# Patient Record
Sex: FEMALE | Race: WHITE | NOT HISPANIC OR LATINO | Employment: OTHER | URBAN - METROPOLITAN AREA
[De-identification: names, ages, dates, MRNs, and addresses within clinical notes are randomized per-mention and may not be internally consistent; named-entity substitution may affect disease eponyms.]

---

## 2023-11-15 RX ORDER — SIMVASTATIN 20 MG/1
1 TABLET, FILM COATED ORAL DAILY
COMMUNITY

## 2023-11-15 RX ORDER — PHENTERMINE HYDROCHLORIDE 37.5 MG/1
37.5 TABLET ORAL DAILY
COMMUNITY
Start: 2023-10-19 | End: 2023-11-18

## 2023-11-15 RX ORDER — MULTIVIT-MIN/IRON/FOLIC ACID/K 18-600-40
1 CAPSULE ORAL DAILY
COMMUNITY

## 2023-11-15 RX ORDER — OMEPRAZOLE 20 MG/1
CAPSULE, DELAYED RELEASE ORAL
COMMUNITY

## 2023-11-15 RX ORDER — NIRMATRELVIR AND RITONAVIR 300-100 MG
KIT ORAL
COMMUNITY
Start: 2023-09-27

## 2023-11-15 RX ORDER — INHALER, ASSIST DEVICES
SPACER (EA) MISCELLANEOUS
COMMUNITY
Start: 2023-03-03

## 2023-11-15 RX ORDER — TIZANIDINE HYDROCHLORIDE 2 MG/1
CAPSULE, GELATIN COATED ORAL
COMMUNITY
Start: 2023-04-17

## 2023-11-15 RX ORDER — COVID-19 ANTIGEN TEST
KIT MISCELLANEOUS
COMMUNITY
Start: 2023-04-24

## 2023-11-15 RX ORDER — CEFUROXIME AXETIL 250 MG/1
250 TABLET ORAL 2 TIMES DAILY
COMMUNITY
Start: 2023-01-16 | End: 2023-01-23

## 2023-11-15 RX ORDER — CHOLECALCIFEROL (VITAMIN D3) 25 MCG
1 TABLET ORAL DAILY
COMMUNITY

## 2023-11-15 RX ORDER — CLOTRIMAZOLE 10 MG/1
LOZENGE ORAL; TOPICAL
COMMUNITY
Start: 2023-05-01

## 2023-11-15 RX ORDER — ALBUTEROL SULFATE 90 UG/1
AEROSOL, METERED RESPIRATORY (INHALATION)
COMMUNITY
Start: 2023-03-03

## 2023-11-15 RX ORDER — CLINDAMYCIN HYDROCHLORIDE 300 MG/1
300 CAPSULE ORAL EVERY 8 HOURS
COMMUNITY
Start: 2022-12-21

## 2023-11-15 RX ORDER — PREDNISONE 20 MG/1
TABLET ORAL
COMMUNITY
Start: 2023-04-20

## 2023-11-15 RX ORDER — FLUTICASONE PROPIONATE 50 MCG
2 SPRAY, SUSPENSION (ML) NASAL DAILY
COMMUNITY
Start: 2023-10-19

## 2023-11-15 RX ORDER — MESALAMINE 800 MG/1
3 TABLET, DELAYED RELEASE ORAL 2 TIMES DAILY
COMMUNITY
Start: 2017-06-26

## 2023-11-15 RX ORDER — AZITHROMYCIN 250 MG/1
TABLET, FILM COATED ORAL
COMMUNITY
Start: 2023-09-25

## 2023-11-15 RX ORDER — TRIAMTERENE AND HYDROCHLOROTHIAZIDE 37.5; 25 MG/1; MG/1
1 CAPSULE ORAL DAILY
COMMUNITY

## 2023-11-15 RX ORDER — VALACYCLOVIR HYDROCHLORIDE 1 G/1
TABLET, FILM COATED ORAL
COMMUNITY
Start: 2022-11-28

## 2023-11-15 RX ORDER — TOFACITINIB 11 MG/1
1 TABLET, FILM COATED, EXTENDED RELEASE ORAL DAILY
COMMUNITY
Start: 2019-12-19

## 2023-11-16 ENCOUNTER — OFFICE VISIT (OUTPATIENT)
Dept: GASTROENTEROLOGY | Facility: CLINIC | Age: 68
End: 2023-11-16
Payer: MEDICARE

## 2023-11-16 ENCOUNTER — LAB (OUTPATIENT)
Dept: LAB | Facility: LAB | Age: 68
End: 2023-11-16
Payer: MEDICARE

## 2023-11-16 VITALS
HEIGHT: 64 IN | SYSTOLIC BLOOD PRESSURE: 144 MMHG | DIASTOLIC BLOOD PRESSURE: 85 MMHG | HEART RATE: 75 BPM | BODY MASS INDEX: 35.67 KG/M2 | TEMPERATURE: 97.2 F | WEIGHT: 208.9 LBS

## 2023-11-16 DIAGNOSIS — K59.00 CONSTIPATION, UNSPECIFIED CONSTIPATION TYPE: ICD-10-CM

## 2023-11-16 DIAGNOSIS — R79.89 ABNORMAL LFTS (LIVER FUNCTION TESTS): ICD-10-CM

## 2023-11-16 DIAGNOSIS — K51.30 ULCERATIVE RECTOSIGMOIDITIS WITHOUT COMPLICATION (MULTI): ICD-10-CM

## 2023-11-16 DIAGNOSIS — R79.89 ABNORMAL LFTS (LIVER FUNCTION TESTS): Primary | ICD-10-CM

## 2023-11-16 LAB
ALBUMIN SERPL BCP-MCNC: 5 G/DL (ref 3.4–5)
ALP SERPL-CCNC: 58 U/L (ref 33–136)
ALT SERPL W P-5'-P-CCNC: 52 U/L (ref 7–45)
AST SERPL W P-5'-P-CCNC: 46 U/L (ref 9–39)
BILIRUB DIRECT SERPL-MCNC: 0.2 MG/DL (ref 0–0.3)
BILIRUB SERPL-MCNC: 0.8 MG/DL (ref 0–1.2)
PROT SERPL-MCNC: 7.3 G/DL (ref 6.4–8.2)

## 2023-11-16 PROCEDURE — 1036F TOBACCO NON-USER: CPT | Performed by: INTERNAL MEDICINE

## 2023-11-16 PROCEDURE — 36415 COLL VENOUS BLD VENIPUNCTURE: CPT

## 2023-11-16 PROCEDURE — 80076 HEPATIC FUNCTION PANEL: CPT

## 2023-11-16 PROCEDURE — 99214 OFFICE O/P EST MOD 30 MIN: CPT | Performed by: INTERNAL MEDICINE

## 2023-11-16 PROCEDURE — 1159F MED LIST DOCD IN RCRD: CPT | Performed by: INTERNAL MEDICINE

## 2023-11-16 PROCEDURE — 1126F AMNT PAIN NOTED NONE PRSNT: CPT | Performed by: INTERNAL MEDICINE

## 2023-11-16 RX ORDER — TOLTERODINE 4 MG/1
CAPSULE, EXTENDED RELEASE ORAL
COMMUNITY
Start: 2023-11-01

## 2023-11-16 RX ORDER — NYSTATIN AND TRIAMCINOLONE ACETONIDE 100000; 1 [USP'U]/G; MG/G
CREAM TOPICAL 3 TIMES DAILY
COMMUNITY
Start: 2021-11-26

## 2023-11-16 RX ORDER — LANOLIN ALCOHOL/MO/W.PET/CERES
1000 CREAM (GRAM) TOPICAL
COMMUNITY

## 2023-11-16 RX ORDER — LIDOCAINE HYDROCHLORIDE 20 MG/ML
JELLY TOPICAL
COMMUNITY
Start: 2021-08-04

## 2023-11-16 RX ORDER — TOFACITINIB 11 MG/1
11 TABLET, FILM COATED, EXTENDED RELEASE ORAL DAILY
COMMUNITY

## 2023-11-16 RX ORDER — ASPIRIN 81 MG/1
81 TABLET ORAL NIGHTLY
COMMUNITY

## 2023-11-16 RX ORDER — VITAMIN E MIXED 400 UNIT
CAPSULE ORAL
COMMUNITY
Start: 2022-04-14

## 2023-11-16 RX ORDER — HYOSCYAMINE SULFATE 0.12 MG/1
TABLET, ORALLY DISINTEGRATING ORAL EVERY 6 HOURS
COMMUNITY
Start: 2019-05-16

## 2023-11-16 ASSESSMENT — PAIN SCALES - GENERAL: PAINLEVEL: 0-NO PAIN

## 2023-11-16 NOTE — PROGRESS NOTES
Subjective   Patient ID: Kay Beltran is a 67 y.o. female who presents for follow-up of UC.  Last seen 11/2022.  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 Course complicated by recurrent C. difficile and s/p FMT 10/2015.     Tried and eventually lost response to multiple medications including 5-ASA, prednisone, Humira, Simponi and Entyvio.    Started Xeljanz 4/29/19 and colitis well controlled on Xeljanz 10 mg BID. 2 formed stools daily without bleeding, cramping, and only occasional urgency. Dose lowered to Xeljanz 5 mg BID and 12/2019 changed to Xeljanz XR 11 mg daily.     7/2020 colonoscopy showed severe sigmoid diverticulosis and Mota 0 colitis in rectum, sigmoid, and at IC valve. Path focal active colitis in cecum/AC with chronic active colitis in left colon with a normal rectum. ?Crohn's colitis?    (+) hospitalized with COVID 19 8/2020    Late 2021 stopped mesalamine.  When last seen 11/2022 having 1-2 soft BM/day without blood/urgency/nighttime awakenings/EIM’s/abdominal pain.  Colonoscopy 2024-25.    In follow-up today, the patient feels well. She is not having any diarrhea. No bloody bowel movements. Taking her Xeljanz XR 11mg daily. She has started on Adipex about a month ago, and she has lost about 13 lbs. She gets some palpitations from this in the morning, but then she eats some breakfast and the palpitations dissipate. Also says that her BP has been running on the higher side since she started Adipex (used to be in the 120s systolic, but recently including today has been running in th e 140s. She also had some pellet like stools in the beginning when she started on Adipex, but now she is doing better and is now having bowel movements every other day. Stools are not as hard now. She is not taking any laxatives. She was thinking of taking some Dulcolax the other day, but did not. Eats a lot of salad and drinks  plenty of water. Also, both she and her  had COVID again in Sept 2023 (says this is her 4th time getting it) and was symptomatic from it and had nausea and vomiting, and fevers and chills. She took Paxlovid. Has some rare aches and pains in her muscles and joints, but attributes this to getting old, but denies any persistent or severe joint pain. She also had a small cyst in the middle of her chest that her PCP removed recently and her wound is well healed now. Denies any abdominal pain, nausea, vomiting, diarrhea. She rarely drinks alcohol (about once per year). AST/ALT are very mildly elevated per labs from March 2023 (< 2 times ULN). She does have fatty liver based on an ultrasound done on 6/9/2022.     Review of Systems  A 12 point ROS was performed and is negative except for HPI.     Objective   Physical Exam  GEN: Elderly white female, sitting comfortably in chair, in NAD  HEENT: No pallor, no scleral icterus. No aphthous ulcers in oral cavity  CVS: S1, S2 heard, RRR, no MRG  CHEST: CTAB  GI: soft, obese body habitus, non-tender, tympanic to percussion, no organomegaly. BS +ve. No fluid wave/ascites   NEURO: AOX3, no asterixis. 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 Patient is asymptomatic on Xeljanz 11 mg once daily. She has no active GI symptoms. No longer on mesalamine.     She continues to do well on current therapy with no disease activity. Next colonoscopy will be in 2024 or 2025. Following that, we may discuss medication de-escalation.      #Constipation: Had gotten better since seen in clinic last time, but then she started Adipex and she is not having daily bowel movements. She is still going every other day or rarely once every 3 days. She has Dulcolax at home which she can take PRN. She can take prune juice, MiraLAX, and/or soluble fiber on a regular basis until she begins to having regular, daily BMs.     #Elevated transaminases: Unclear cause, but unlikely to be related to Xeljanz. Most likely related to known fatty liver disease. No hepatomegaly on exam. LFTs have not been checked since March 2023, so we will repeat it today     Plan:  1) continue Xeljanz 11 mg once daily  2) follow-up in the office in 1 year  3) Repeat LFTs   4) call the office with change in symptoms  5) Next colonoscopy between summer of 2024 and summer of 2025

## 2023-11-16 NOTE — PATIENT INSTRUCTIONS
Thank you for coming in for follow-up.  It is great that you feel well.  As we discussed today, some of your liver test were mildly elevated 6 months ago and we will repeat these to see if they are improved, the same, or have worsened.  We will let you know those results.  As reviewed today:    1) continue Xeljanz XR 11 mg once daily  2) for constipation try using an osmotic laxative, such as prune juice, MiraLAX, or milk of magnesia as needed, or, use Dulcolax as needed  3) check liver function test; we will let you know the results  4) as long as you are doing well follow-up in the office in 1 year  5) call the office with any worsening GI symptoms  6) your next colonoscopy should be sometime between the summer 2024 in the summer 2025

## 2024-02-12 DIAGNOSIS — K51.918 ULCERATIVE COLITIS WITH OTHER COMPLICATION, UNSPECIFIED LOCATION (MULTI): Primary | ICD-10-CM

## 2024-02-16 ENCOUNTER — LAB (OUTPATIENT)
Dept: LAB | Facility: LAB | Age: 69
End: 2024-02-16
Payer: MEDICARE

## 2024-02-16 DIAGNOSIS — K51.918 ULCERATIVE COLITIS WITH OTHER COMPLICATION, UNSPECIFIED LOCATION (MULTI): ICD-10-CM

## 2024-02-16 PROCEDURE — 80076 HEPATIC FUNCTION PANEL: CPT

## 2024-02-16 PROCEDURE — 36415 COLL VENOUS BLD VENIPUNCTURE: CPT

## 2024-02-17 LAB
ALBUMIN SERPL BCP-MCNC: 4.8 G/DL (ref 3.4–5)
ALP SERPL-CCNC: 50 U/L (ref 33–136)
ALT SERPL W P-5'-P-CCNC: 21 U/L (ref 7–45)
AST SERPL W P-5'-P-CCNC: 24 U/L (ref 9–39)
BILIRUB DIRECT SERPL-MCNC: 0.2 MG/DL (ref 0–0.3)
BILIRUB SERPL-MCNC: 0.9 MG/DL (ref 0–1.2)
PROT SERPL-MCNC: 6.8 G/DL (ref 6.4–8.2)

## 2024-05-30 ENCOUNTER — LAB (OUTPATIENT)
Dept: LAB | Facility: LAB | Age: 69
End: 2024-05-30
Payer: MEDICARE

## 2024-05-30 DIAGNOSIS — K51.918 ULCERATIVE COLITIS WITH OTHER COMPLICATION, UNSPECIFIED LOCATION (MULTI): ICD-10-CM

## 2024-05-30 PROCEDURE — 80076 HEPATIC FUNCTION PANEL: CPT

## 2024-05-30 PROCEDURE — 36415 COLL VENOUS BLD VENIPUNCTURE: CPT

## 2024-05-31 LAB
ALBUMIN SERPL BCP-MCNC: 5.1 G/DL (ref 3.4–5)
ALP SERPL-CCNC: 51 U/L (ref 33–136)
ALT SERPL W P-5'-P-CCNC: 17 U/L (ref 7–45)
AST SERPL W P-5'-P-CCNC: 19 U/L (ref 9–39)
BILIRUB DIRECT SERPL-MCNC: 0.1 MG/DL (ref 0–0.3)
BILIRUB SERPL-MCNC: 0.7 MG/DL (ref 0–1.2)
PROT SERPL-MCNC: 7.3 G/DL (ref 6.4–8.2)

## 2024-08-23 ENCOUNTER — LAB (OUTPATIENT)
Dept: LAB | Facility: LAB | Age: 69
End: 2024-08-23
Payer: MEDICARE

## 2024-08-23 DIAGNOSIS — K51.918 ULCERATIVE COLITIS WITH OTHER COMPLICATION, UNSPECIFIED LOCATION (MULTI): ICD-10-CM

## 2024-08-23 LAB
ALBUMIN SERPL BCP-MCNC: 4.8 G/DL (ref 3.4–5)
ALP SERPL-CCNC: 50 U/L (ref 33–136)
ALT SERPL W P-5'-P-CCNC: 15 U/L (ref 7–45)
AST SERPL W P-5'-P-CCNC: 22 U/L (ref 9–39)
BILIRUB DIRECT SERPL-MCNC: 0.2 MG/DL (ref 0–0.3)
BILIRUB SERPL-MCNC: 0.8 MG/DL (ref 0–1.2)
PROT SERPL-MCNC: 7.2 G/DL (ref 6.4–8.2)

## 2024-08-23 PROCEDURE — 36415 COLL VENOUS BLD VENIPUNCTURE: CPT

## 2024-08-23 PROCEDURE — 80076 HEPATIC FUNCTION PANEL: CPT

## 2024-08-27 DIAGNOSIS — K51.30 ULCERATIVE RECTOSIGMOIDITIS WITHOUT COMPLICATION (MULTI): Primary | ICD-10-CM

## 2025-03-01 LAB
ALBUMIN SERPL-MCNC: 5.1 G/DL (ref 3.6–5.1)
ALBUMIN/GLOB SERPL: 2 (CALC) (ref 1–2.5)
ALP SERPL-CCNC: 54 U/L (ref 37–153)
ALT SERPL-CCNC: 15 U/L (ref 6–29)
AST SERPL-CCNC: 20 U/L (ref 10–35)
BILIRUB DIRECT SERPL-MCNC: 0.1 MG/DL
BILIRUB INDIRECT SERPL-MCNC: 0.6 MG/DL (CALC) (ref 0.2–1.2)
BILIRUB SERPL-MCNC: 0.7 MG/DL (ref 0.2–1.2)
GLOBULIN SER CALC-MCNC: 2.5 G/DL (CALC) (ref 1.9–3.7)
PROT SERPL-MCNC: 7.6 G/DL (ref 6.1–8.1)

## 2025-04-23 NOTE — PROGRESS NOTES
"Subjective   Patient ID: Kay Beltran is a 69 y.o. female who presents for follow-up of UC.  Last seen 11/2023.  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 Course complicated by recurrent C. difficile and s/p FMT 10/2015.     Tried and eventually lost response to multiple medications including 5-ASA, prednisone, Humira, Simponi and Entyvio.    Started Xeljanz 4/29/19 and colitis well controlled on Xeljanz 10 mg BID. 2 formed stools daily without bleeding, cramping, and only occasional urgency. Dose lowered to Xeljanz 5 mg BID and 12/2019 changed to Xeljanz XR 11 mg daily.     7/2020 colonoscopy showed severe sigmoid diverticulosis and Mota 0 colitis in rectum, sigmoid, and at IC valve. Path focal active colitis in cecum/AC with chronic active colitis in left colon with a normal rectum. ?Crohn's colitis?    (+) hospitalized with COVID 19 8/2020    Late 2021 stopped mesalamine; having 1-2 soft BM/day without blood/urgency/nighttime awakenings/EIM’s/abdominal pain.  Colonoscopy between 2024-25.    Last seen 11/2023 and felt well. No diarrhea. No bloody bowel movements. Taking her Xeljanz XR 11mg daily. BM every other day.    (+) fatty liver based on an ultrasound done on 6/9/2022; hepatic panel WNL 2/2025.    In follow-up today, patient repots doing well.  Stools are formed and every 2 to 3 days.  If she feels like she is not moving her bowels enough, she will take sodium docusate 3 in a day and will have a bowel movement the next day.  She is not having discomfort.  She is not having fecal soiling.  There is no rectal bleeding.  She is very happy with the state of her proctosigmoid colitis.    Review of Systems  A 12 point ROS was performed and is negative except for HPI.     Objective   Physical Exam  /78   Pulse 71   Temp 36.6 °C (97.9 °F) (Temporal)   Ht 1.6 m (5' 3\")   Wt 75.3 kg (166 lb)   BMI 29.41 kg/m²   Vital " signs reviewed  Patient alert and oriented in no acute distress  Anicteric  No cervical adenopathy  Cardiac exam regular rate and rhythm S1-S2 without murmurs gallops or rubs  Lungs clear to auscultation bilaterally  Abdomen soft and nontender without organomegaly or mass.  No rebound or guarding.  Bowel sounds present  Extremities without edema      Assessment/Plan   #IBD colitis-proctosigmoiditis for approximately 20 years; uncertain if this is ulcerative colitis, diverticular associated colitis, or Crohn's colitis, but most likely UC variant. Patient is asymptomatic on Xeljanz 11 mg once daily.  She is clinically in remission on therapy.  She is due for surveillance colonoscopy.  Labs look okay     #Elevated transaminases: Repeat labs 2/2025 were normal.  Will monitor annually  .  Plan:  1) continue Xeljanz 11 mg once daily  2) schedule colonoscopy  3) as long as remains stable, follow-up in the office in 1 year  4) contact the office with any worsening symptoms, questions, or concerns

## 2025-04-24 ENCOUNTER — OFFICE VISIT (OUTPATIENT)
Dept: GASTROENTEROLOGY | Facility: CLINIC | Age: 70
End: 2025-04-24
Payer: MEDICARE

## 2025-04-24 VITALS
SYSTOLIC BLOOD PRESSURE: 150 MMHG | HEIGHT: 63 IN | BODY MASS INDEX: 29.41 KG/M2 | DIASTOLIC BLOOD PRESSURE: 78 MMHG | WEIGHT: 166 LBS | TEMPERATURE: 97.9 F | HEART RATE: 71 BPM

## 2025-04-24 DIAGNOSIS — R79.89 ABNORMAL LFTS (LIVER FUNCTION TESTS): ICD-10-CM

## 2025-04-24 DIAGNOSIS — K59.00 CONSTIPATION, UNSPECIFIED CONSTIPATION TYPE: ICD-10-CM

## 2025-04-24 DIAGNOSIS — K51.30 ULCERATIVE RECTOSIGMOIDITIS WITHOUT COMPLICATION (MULTI): Primary | ICD-10-CM

## 2025-04-24 PROCEDURE — 1126F AMNT PAIN NOTED NONE PRSNT: CPT | Performed by: INTERNAL MEDICINE

## 2025-04-24 PROCEDURE — 1159F MED LIST DOCD IN RCRD: CPT | Performed by: INTERNAL MEDICINE

## 2025-04-24 PROCEDURE — 99213 OFFICE O/P EST LOW 20 MIN: CPT | Performed by: INTERNAL MEDICINE

## 2025-04-24 PROCEDURE — 3008F BODY MASS INDEX DOCD: CPT | Performed by: INTERNAL MEDICINE

## 2025-04-24 PROCEDURE — 1036F TOBACCO NON-USER: CPT | Performed by: INTERNAL MEDICINE

## 2025-04-24 ASSESSMENT — PAIN SCALES - GENERAL: PAINLEVEL_OUTOF10: 0-NO PAIN

## 2025-04-24 NOTE — PATIENT INSTRUCTIONS
Thank you for coming in for follow-up today.  It is great that you continue to feel well and that your ulcerative colitis is under good control.  You are due for colonoscopy.  Your most recent labs all look okay.  As long as you are continue to do well and your colonoscopy looks good, you can continue the on the Xeljanz 11 mg daily.  As we reviewed today:    Plan:  1) continue Xeljanz 11 mg once daily  2) schedule colonoscopy  3) as long as remains stable, follow-up in the office in 1 year  4) contact the office with any worsening symptoms, questions, or concerns

## 2025-04-24 NOTE — PROGRESS NOTES
Subjective   Patient ID: Kay Beltran is a 69 y.o. female who presents for follow-up of UC.  Last seen 11/2023.  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 Course complicated by recurrent C. difficile and s/p FMT 10/2015.     Tried and eventually lost response to multiple medications including 5-ASA, prednisone, Humira, Simponi and Entyvio.    Started Xeljanz 4/29/19 and colitis well controlled on Xeljanz 10 mg BID. 2 formed stools daily without bleeding, cramping, and only occasional urgency. Dose lowered to Xeljanz 5 mg BID and 12/2019 changed to Xeljanz XR 11 mg daily.     7/2020 colonoscopy showed severe sigmoid diverticulosis and Mota 0 colitis in rectum, sigmoid, and at IC valve. Path focal active colitis in cecum/AC with chronic active colitis in left colon with a normal rectum. ?Crohn's colitis?    (+) hospitalized with COVID 19 8/2020    Late 2021 stopped mesalamine; having 1-2 soft BM/day without blood/urgency/nighttime awakenings/EIM’s/abdominal pain.  Colonoscopy between 2024-25.    Last seen 11/2023 and felt well. No diarrhea. No bloody bowel movements. Taking her Xeljanz XR 11mg daily. BM every other day.    (+) fatty liver based on an ultrasound done on 6/9/2022; hepatic panel WNL 2/2025.    In follow-up today, patient repots that she continues to feel great. Patient moves bowel few times per week. Triple the dose of colase when constipated. Denies blood in stool. Denies abdominal pain. Denies issues with blood clots. Lost weight with the help of phenteramine.  Spells of swallowing issues with dried food. Not particular triggers such as meat, steak or even liquid. No family history of esophageal cancer or Barette's esophagus.       Review of Systems  A 12 point ROS was performed and is negative except for HPI.     Objective   Physical Exam    Assessment/Plan   #IBD colitis-proctosigmoiditis for approximately 20  years; uncertain if this is ulcerative colitis, diverticular associated colitis, or Crohn's colitis, but most likely UC variant.   Patient remains to be in clinical remission on Xeljianz 11mg daily. Last colonoscopy 7/2020 with inactive quiescent UC. Path showed patchy chronic active colitis in descending and sigmoid. Remaining colon without finding except cecum with focal active colitis. Will plan for surveillance colonoscopy. Depending on the finding (ie histologic remission), we may discontinue Xeljianz. Will defer EGD as for now. Will defer blood work giving issues with insurance/cost.      #Elevated transaminases: Mild elevation in 11/2023 ALT 52, AST 46 but has been normal since 2/2024, which is reassuring.     Plan:  1) continue Xeljanz 11 mg once daily  2) Plan to repeat colonoscopy.  3) Return to clinic in 1 year. Patient to reach out to office for worsening symptoms.

## 2025-08-25 RX ORDER — RIZATRIPTAN BENZOATE 10 MG/1
10 TABLET ORAL ONCE AS NEEDED
COMMUNITY

## 2025-08-25 RX ORDER — DIVALPROEX SODIUM 250 MG/1
250 TABLET, FILM COATED, EXTENDED RELEASE ORAL NIGHTLY
COMMUNITY

## 2025-08-28 RX ORDER — ONDANSETRON HYDROCHLORIDE 2 MG/ML
4 INJECTION, SOLUTION INTRAVENOUS ONCE AS NEEDED
Status: CANCELLED | OUTPATIENT
Start: 2025-08-28

## 2025-08-29 ENCOUNTER — DOCUMENTATION (OUTPATIENT)
Dept: GASTROENTEROLOGY | Facility: HOSPITAL | Age: 70
End: 2025-08-29

## 2025-08-29 ENCOUNTER — HOSPITAL ENCOUNTER (OUTPATIENT)
Dept: GASTROENTEROLOGY | Facility: HOSPITAL | Age: 70
Discharge: HOME | End: 2025-08-29
Payer: MEDICARE

## 2025-08-29 VITALS
DIASTOLIC BLOOD PRESSURE: 66 MMHG | SYSTOLIC BLOOD PRESSURE: 106 MMHG | WEIGHT: 177.03 LBS | RESPIRATION RATE: 15 BRPM | TEMPERATURE: 97.2 F | OXYGEN SATURATION: 100 % | BODY MASS INDEX: 31.36 KG/M2 | HEART RATE: 72 BPM

## 2025-08-29 DIAGNOSIS — K51.30 ULCERATIVE RECTOSIGMOIDITIS WITHOUT COMPLICATION (MULTI): ICD-10-CM

## 2025-08-29 PROCEDURE — 3700000013 HC SEDATION LEVEL 5+ TIME - EACH ADDITIONAL 15 MINUTES

## 2025-08-29 PROCEDURE — 7100000009 HC PHASE TWO TIME - INITIAL BASE CHARGE

## 2025-08-29 PROCEDURE — 2500000005 HC RX 250 GENERAL PHARMACY W/O HCPCS: Performed by: INTERNAL MEDICINE

## 2025-08-29 PROCEDURE — G0500 MOD SEDAT ENDO SERVICE >5YRS: HCPCS | Performed by: INTERNAL MEDICINE

## 2025-08-29 PROCEDURE — 7100000010 HC PHASE TWO TIME - EACH INCREMENTAL 1 MINUTE

## 2025-08-29 PROCEDURE — 3700000012 HC SEDATION LEVEL 5+ TIME - INITIAL 15 MINUTES 5/> YEARS

## 2025-08-29 PROCEDURE — 45380 COLONOSCOPY AND BIOPSY: CPT | Performed by: INTERNAL MEDICINE

## 2025-08-29 PROCEDURE — 2500000004 HC RX 250 GENERAL PHARMACY W/ HCPCS (ALT 636 FOR OP/ED): Performed by: INTERNAL MEDICINE

## 2025-08-29 RX ORDER — FENTANYL CITRATE 50 UG/ML
INJECTION, SOLUTION INTRAMUSCULAR; INTRAVENOUS AS NEEDED
Status: COMPLETED | OUTPATIENT
Start: 2025-08-29 | End: 2025-08-29

## 2025-08-29 RX ORDER — MIDAZOLAM HYDROCHLORIDE 2 MG/2ML
INJECTION, SOLUTION INTRAMUSCULAR; INTRAVENOUS AS NEEDED
Status: COMPLETED | OUTPATIENT
Start: 2025-08-29 | End: 2025-08-29

## 2025-08-29 RX ADMIN — MIDAZOLAM HYDROCHLORIDE 1 MG: 1 INJECTION, SOLUTION INTRAMUSCULAR; INTRAVENOUS at 14:23

## 2025-08-29 RX ADMIN — Medication 1 DOSE: at 14:03

## 2025-08-29 RX ADMIN — MIDAZOLAM HYDROCHLORIDE 2 MG: 1 INJECTION, SOLUTION INTRAMUSCULAR; INTRAVENOUS at 14:10

## 2025-08-29 RX ADMIN — FENTANYL CITRATE 50 MCG: 50 INJECTION, SOLUTION INTRAMUSCULAR; INTRAVENOUS at 14:13

## 2025-08-29 RX ADMIN — FENTANYL CITRATE 50 MCG: 50 INJECTION, SOLUTION INTRAMUSCULAR; INTRAVENOUS at 14:10

## 2025-08-29 RX ADMIN — MIDAZOLAM HYDROCHLORIDE 2 MG: 1 INJECTION, SOLUTION INTRAMUSCULAR; INTRAVENOUS at 14:13

## 2025-08-29 ASSESSMENT — PAIN - FUNCTIONAL ASSESSMENT
PAIN_FUNCTIONAL_ASSESSMENT: 0-10

## 2025-08-29 ASSESSMENT — PAIN SCALES - GENERAL
PAINLEVEL_OUTOF10: 2
PAINLEVEL_OUTOF10: 0 - NO PAIN

## 2025-09-05 LAB
LABORATORY COMMENT REPORT: NORMAL
PATH REPORT.FINAL DX SPEC: NORMAL
PATH REPORT.GROSS SPEC: NORMAL
PATH REPORT.TOTAL CANCER: NORMAL